# Patient Record
Sex: FEMALE | Race: WHITE | HISPANIC OR LATINO | URBAN - METROPOLITAN AREA
[De-identification: names, ages, dates, MRNs, and addresses within clinical notes are randomized per-mention and may not be internally consistent; named-entity substitution may affect disease eponyms.]

---

## 2017-04-17 ENCOUNTER — ALLSCRIPTS OFFICE VISIT (OUTPATIENT)
Dept: OTHER | Facility: OTHER | Age: 22
End: 2017-04-17

## 2017-05-09 ENCOUNTER — ALLSCRIPTS OFFICE VISIT (OUTPATIENT)
Dept: OTHER | Facility: OTHER | Age: 22
End: 2017-05-09

## 2017-06-13 ENCOUNTER — ALLSCRIPTS OFFICE VISIT (OUTPATIENT)
Dept: OTHER | Facility: OTHER | Age: 22
End: 2017-06-13

## 2018-01-10 NOTE — MISCELLANEOUS
Message  no show annual, called l/m  Active Problems    1  Contraception (V25 9) (Z30 9)   2  Does not have health insurance (V60 89) (Z59 8)   3  Encounter for contraceptive surveillance (V25 40) (Z30 40)   4  Need for HPV vaccination (V04 89) (Z23)   5  Pap smear for cervical cancer screening (V76 2) (Z12 4)   6  Rotator cuff tear (840 4) (M75 100)   7  Shoulder pain, left (729 41) (M22 512)    Current Meds   1  Sprintec 28 0 25-35 MG-MCG Oral Tablet; TAKE 1 TABLET DAILY; Therapy: [id number] to ([phone])  Requested for: 51Avv6708; Last   Rx:[id number] Ordered    Allergies    1   No Known Drug Allergies    Signatures   Electronically signed by : Blake Osgood, ; Jun 13 2017 10:16AM EST                       (Author)

## 2018-01-13 VITALS
HEIGHT: 65 IN | SYSTOLIC BLOOD PRESSURE: 135 MMHG | BODY MASS INDEX: 39.99 KG/M2 | WEIGHT: 240 LBS | DIASTOLIC BLOOD PRESSURE: 81 MMHG

## 2018-01-13 VITALS
WEIGHT: 241 LBS | HEIGHT: 65 IN | BODY MASS INDEX: 40.15 KG/M2 | SYSTOLIC BLOOD PRESSURE: 138 MMHG | DIASTOLIC BLOOD PRESSURE: 67 MMHG